# Patient Record
(demographics unavailable — no encounter records)

---

## 2024-10-08 NOTE — ASSESSMENT
Patient [Educated Patient & Family about Diagnosis] : educated the patient and family about the diagnosis [Discussed with Family to Call in ____ week(s) for Test Results] : discussed with family to call in [unfilled] week(s) to obtain test results and with update on child's condition.  Family should call sooner if clinically indicated. [FreeTextEntry1] : 17 year old female with history of migraines, possible endometriosis undergoing evaluation and asthma. She has + lupus anti-coagulant.and anti-phospolid antibody and was found to have acute elevated liver enzymes in the past month that has worsen.  She has multiple non-specific symptoms that can be seen in patients with liver disease (fatigue, weight loss, joint pain, pruritus). Today will be sending panel of screening labs. Ultrasound done recently was normal, which makes an acute injury likely. I also discussed the possibility of liver biopsy if liver enzymes continue to worsen or in the setting of unknown liver disease. Depending on results will discuss further evaluation.   Lastly as she has had significant weight loss, I encourage her to eat at least 2 meals a day, nutritional supplements such katefarms/ensure were advised, increase her intake of protein. I also discussed that she is going to develop complications of malnourishment.  I spent 70 minutes reviewing the patient's diagnostic tests, examining the patient, discussing the next steps for treatment plan, adding additional orders for labs and documenting in the patient's medical record.

## 2024-10-08 NOTE — HISTORY OF PRESENT ILLNESS
[FreeTextEntry1] : 17 year old female with history of migraines, possible endometriosis undergoing evaluation and asthma. She has + lupus anti-coagulant.and anti-phospolid  antibody in blood but mother is unclear she has a diagnosis of SLE.   She was evaluated by rheumatology due to joint pain , facial erythema and fatigue about 1 year ago and so she undergone a full rheumatologic evaluation that showed + lupus anticoagulant and antiphospholipid antibody. She was started on hydroxychloroquine for it but mother is not sure she has SLE or anti-phospolipid syndrome diagnosis. Hematology follows her too as she is on OCPS and positive antibodies in blood making her more susceptible for clots. She has anemia and received iron infusion.  Due to possible endometriosis, she has suffered from recurrent abdominal pain and nausea, she underwent colonoscopy and endoscopy that showed some colonic polyps and H.pylori. Done in april/may 2024. H.pylori treated, awaiting another colonoscopy as polyps not removed on initial colonosocpy.  Has tried tums, PPI and other antacids for nausea.  She is getting laparoscopic evaluation for endometriosis on 10/17/24  During last visit to rheumatologist, she was found with elevated liver enzymes, labs repeated that showed worsening of liver enzymes (see below).   Labs: Initial labs done as part of routine rheumatology.  - 9/5/2024 AST: 37 ALT: 109 Alkphos: 37 Albumin: 4.4 sed rate: 2 cbc:  9/30/24: AST: 97 ALT: 337  Tbili: 0.8 alkphos: 31 LKM: negative, smooth muscle: negative hep Bcore, Ag and antibody : negative iron: 199 ferritin 405  ultrasound of the abdomen 10/4/24: normal.   For the past 3 months she has had significant decrease of appetite, she is now eating only once a day at night time, bland foods such as spaghetti with no sauce and rice, she is avoiding milk, fatty foods.   She is now getting mental confusion and has had more tripping than before. iron infusion has not help with fatigue.  Denied jaundice, hematemesis, blood in the stools, and acholic stools. She has seen bruises a lot in her legs and has had excessive itchiness at night.  She has warts over her body.  She has a BM 3x per week, dark urine but does not drink enough water.  Denied recent infections or travel.   Medications: OCPs Guerita daily with no break, qlepta daily, qvar daily for asthma and hydroxychloroquine that was started about 6 months ago. Denied any dietary supplements, herbal medicines or any other drugs.   Pertinent family history: father with ulcerative colitis, possible ankylosing spondilitis, mother with hashimoto's and graves disease, rheinoid  No family history of consanguinity.

## 2024-10-08 NOTE — PHYSICAL EXAM
[Well Developed] : well developed [Well Nourished] : well nourished [Alert and Active] : alert and active [Thin] : thin [No HSM] : no hepatosplenomegaly appreciated [Verbal] : verbal [Adipose Appearing] : not adipose appearing [icteric] : anicteric [Oral Ulcers] : no oral ulcers [Respiratory Distress] : no respiratory distress  [Lymphadenopathy] : no lymphadenopathy  [Cyanosis] : no cyanosis [Acanthosis Nigricans] : no acanthosis nigricans [Eczema] : no eczema [Jaundice] : no jaundice

## 2024-10-08 NOTE — CONSULT LETTER
[Dear  ___] : Dear  [unfilled], [Consult Letter:] : I had the pleasure of evaluating your patient, [unfilled]. [Please see my note below.] : Please see my note below. [Consult Closing:] : Thank you very much for allowing me to participate in the care of this patient.  If you have any questions, please do not hesitate to contact me. [Sincerely,] : Sincerely, [FreeTextEntry3] : Mana Masterson MD Division of Pediatric Gastroenterology and Nutrition [facility] [address], [address] [place] [address] Tel: [phone] Fax: [phone]

## 2024-10-08 NOTE — REVIEW OF SYSTEMS
[Asthma] : asthma [Joint Pain] : joint pain [Myalgia] : myalgia  [Headache] : headache [Weakness] : weakness [Anemia] : anemia [Bruising] : bruising [Pruritus] : pruritus [Fever] : no fever [Icteric Sclera] : no icteric sclera [Oral Ulcer] : no oral ulcer [Edema] : no edema [Diabetes] : no diabetes [Immune Deficiencies] : no immune deficiencies

## 2024-10-08 NOTE — REASON FOR VISIT
[Abnormal liver enzymes] : abnormal liver enzymes [Mother] : mother [Medical Records] : medical records

## 2024-10-29 NOTE — PLAN
[de-identified] : 1. no heavy lifting x 6wks 2. benign path/peritoneal endometriosis-- reviewed with patient and mother in detail, will continue Suzie 3. RTO 6mo for pill surveillance and annual exam

## 2024-10-29 NOTE — HISTORY OF PRESENT ILLNESS
[Clean/Dry/Intact] : clean, dry and intact [None] : no vaginal bleeding [Pathology reviewed] : pathology reviewed [de-identified] : Doing well. Tolerating diet and ambulation without difficulties. No postop pain.